# Patient Record
Sex: MALE | Race: BLACK OR AFRICAN AMERICAN | URBAN - METROPOLITAN AREA
[De-identification: names, ages, dates, MRNs, and addresses within clinical notes are randomized per-mention and may not be internally consistent; named-entity substitution may affect disease eponyms.]

---

## 2018-04-21 ENCOUNTER — TELEPHONE (OUTPATIENT)
Dept: PEDIATRICS | Facility: CLINIC | Age: 23
End: 2018-04-21

## 2018-04-21 NOTE — TELEPHONE ENCOUNTER
4/21/2018    Call Regarding ReattributionPhysical    Attempt 1    Message on voicemail     Comments:       Outreach   SB

## 2018-06-28 NOTE — TELEPHONE ENCOUNTER
6/28/2018    Call Regarding ReattributionPhysical    Attempt 2    Message on voicemail     Comments:       Outreach   CC

## 2018-10-04 NOTE — TELEPHONE ENCOUNTER
10/4/2018    Call Regarding ReattributionPhysical    Attempt 3    Message on voicemail     Comments:       Outreach   CC